# Patient Record
Sex: FEMALE | Race: AMERICAN INDIAN OR ALASKA NATIVE
[De-identification: names, ages, dates, MRNs, and addresses within clinical notes are randomized per-mention and may not be internally consistent; named-entity substitution may affect disease eponyms.]

---

## 2017-10-26 ENCOUNTER — HOSPITAL ENCOUNTER (EMERGENCY)
Dept: HOSPITAL 5 - ED | Age: 62
Discharge: HOME | End: 2017-10-26
Payer: SELF-PAY

## 2017-10-26 VITALS — SYSTOLIC BLOOD PRESSURE: 131 MMHG | DIASTOLIC BLOOD PRESSURE: 81 MMHG

## 2017-10-26 DIAGNOSIS — I10: ICD-10-CM

## 2017-10-26 DIAGNOSIS — R56.9: Primary | ICD-10-CM

## 2017-10-26 LAB
ANION GAP SERPL CALC-SCNC: 13 MMOL/L
APTT BLD: 28.2 SEC. (ref 24.2–36.6)
BILIRUB UR QL STRIP: (no result)
BLOOD UR QL VISUAL: (no result)
BUN SERPL-MCNC: 7 MG/DL (ref 7–17)
BUN/CREAT SERPL: 10 %
CALCIUM SERPL-MCNC: 10.5 MG/DL (ref 8.4–10.2)
CHLORIDE SERPL-SCNC: 102.6 MMOL/L (ref 98–107)
CO2 SERPL-SCNC: 27 MMOL/L (ref 22–30)
GLUCOSE SERPL-MCNC: 92 MG/DL (ref 65–100)
HCT VFR BLD CALC: 41.5 % (ref 30.3–42.9)
HGB BLD-MCNC: 13.8 GM/DL (ref 10.1–14.3)
INR PPP: 0.94 (ref 0.87–1.13)
KETONES UR STRIP-MCNC: (no result) MG/DL
LEUKOCYTE ESTERASE UR QL STRIP: (no result)
MCH RBC QN AUTO: 27 PG (ref 28–32)
MCHC RBC AUTO-ENTMCNC: 33 % (ref 30–34)
MCV RBC AUTO: 82 FL (ref 79–97)
MUCOUS THREADS #/AREA URNS HPF: (no result) /HPF
NITRITE UR QL STRIP: (no result)
PH UR STRIP: 7 [PH] (ref 5–7)
PLATELET # BLD: 243 K/MM3 (ref 140–440)
POTASSIUM SERPL-SCNC: 4.4 MMOL/L (ref 3.6–5)
PROT UR STRIP-MCNC: (no result) MG/DL
RBC # BLD AUTO: 5.06 M/MM3 (ref 3.65–5.03)
RBC #/AREA URNS HPF: < 1 /HPF (ref 0–6)
SODIUM SERPL-SCNC: 138 MMOL/L (ref 137–145)
UROBILINOGEN UR-MCNC: < 2 MG/DL (ref ?–2)
WBC # BLD AUTO: 5.2 K/MM3 (ref 4.5–11)
WBC #/AREA URNS HPF: < 1 /HPF (ref 0–6)

## 2017-10-26 PROCEDURE — 93005 ELECTROCARDIOGRAM TRACING: CPT

## 2017-10-26 PROCEDURE — 71010: CPT

## 2017-10-26 PROCEDURE — 82550 ASSAY OF CK (CPK): CPT

## 2017-10-26 PROCEDURE — 82962 GLUCOSE BLOOD TEST: CPT

## 2017-10-26 PROCEDURE — 84702 CHORIONIC GONADOTROPIN TEST: CPT

## 2017-10-26 PROCEDURE — 85610 PROTHROMBIN TIME: CPT

## 2017-10-26 PROCEDURE — 93010 ELECTROCARDIOGRAM REPORT: CPT

## 2017-10-26 PROCEDURE — 36415 COLL VENOUS BLD VENIPUNCTURE: CPT

## 2017-10-26 PROCEDURE — 85027 COMPLETE CBC AUTOMATED: CPT

## 2017-10-26 PROCEDURE — 85730 THROMBOPLASTIN TIME PARTIAL: CPT

## 2017-10-26 PROCEDURE — 80048 BASIC METABOLIC PNL TOTAL CA: CPT

## 2017-10-26 PROCEDURE — 81001 URINALYSIS AUTO W/SCOPE: CPT

## 2017-10-26 PROCEDURE — 70450 CT HEAD/BRAIN W/O DYE: CPT

## 2017-10-26 PROCEDURE — 99285 EMERGENCY DEPT VISIT HI MDM: CPT

## 2017-10-26 NOTE — EMERGENCY DEPARTMENT REPORT
ED General Adult HPI





- General


Chief complaint: Seizure


Stated complaint: SEIZURE


Time Seen by Provider: 10/26/17 12:09


Source: patient, EMS (ems notes not available at time of  chart dictation), RN 

notes reviewed


Mode of arrival: Stretcher


Limitations: Altered Mental Status, Physical Limitation





- History of Present Illness


Initial comments: 





Past medical history: Ischemic stroke, left-sided, right-sided deficits, 

patient has home health aid, and is dependent on others for activities of daily 

living.





Also has a history of multiple sclerosis and hypertension; private neurologist 

is Dr. Sj Cuenca





Patient is brought to the hospital by EMS for seizure-like activity.  As per 

verbal report from nurse, there is no trauma, family corroborates this, patient 

given Ativan in the field by EMS, he should now sleepy, and is not able to 

describe exacerbating or relieving factors.





As per her family, patient has been neurologically devastated from her prior 

stroke, and is heavily dependent on home health aide for help with activities 

of daily living.  At her baseline, she requires assistance with getting dressed

, getting closed, going to the bathroom.  No fevers or chills.





-: Gradual


Improves with: medication





- Related Data


 Home Medications











 Medication  Instructions  Recorded  Confirmed  Last Taken


 


Losartan Potassium 50 mg PO DAILY 10/26/17 10/26/17 Unknown


 


Metoprolol 25 mg PO BID 10/26/17 10/26/17 Unknown


 


Sertraline 50 mg PO DAILY 10/26/17 10/26/17 Unknown


 


Vitamin D3 500 mg PO DAILY 10/26/17 10/26/17 Unknown


 


amLODIPine 5 mg PO DAILY 10/26/17 10/26/17 Unknown








 Previous Rx's











 Medication  Instructions  Recorded  Last Taken  Type


 


levETIRAcetam [Keppra ORAL LIQ] 500 mg PO BID #1 bottle 10/26/17 Unknown Rx











 Allergies











Allergy/AdvReac Type Severity Reaction Status Date / Time


 


No Known Allergies Allergy   Verified 10/26/17 11:50














ED Review of Systems


ROS: 


Stated complaint: SEIZURE


Other details as noted in HPI





Constitutional: malaise


Eyes: denies: vision change


ENT: denies: epistaxis


Respiratory: denies: cough


Cardiovascular: denies: chest pain


Gastrointestinal: denies: vomiting


Genitourinary: as per HPI


Musculoskeletal: as per HPI


Skin: as per HPI


Neurological: as per HPI, weakness


Psychiatric: as per HPI





ED Past Medical Hx





- Past Medical History


Hx Hypertension: Yes


Additional medical history: MS stroke





- Surgical History


Past Surgical History?: No





- Social History


Smoking Status: Never Smoker


Substance Use Type: None





- Medications


Home Medications: 


 Home Medications











 Medication  Instructions  Recorded  Confirmed  Last Taken  Type


 


Losartan Potassium 50 mg PO DAILY 10/26/17 10/26/17 Unknown History


 


Metoprolol 25 mg PO BID 10/26/17 10/26/17 Unknown History


 


Sertraline 50 mg PO DAILY 10/26/17 10/26/17 Unknown History


 


Vitamin D3 500 mg PO DAILY 10/26/17 10/26/17 Unknown History


 


amLODIPine 5 mg PO DAILY 10/26/17 10/26/17 Unknown History


 


levETIRAcetam [Keppra ORAL LIQ] 500 mg PO BID #1 bottle 10/26/17  Unknown Rx














ED Physical Exam





- General


Limitations: Altered Mental Status, Physical Limitation


General appearance: in no apparent distress





- Head


Head exam: Present: atraumatic, normocephalic





- Eye


Eye exam: Present: normal appearance, PERRL, EOMI





- ENT


ENT exam: Present: normal exam, normal orophraynx, mucous membranes moist, TM's 

normal bilaterally, normal external ear exam





- Neck


Neck exam: Present: normal inspection, full ROM.  Absent: tenderness, 

meningismus





- Respiratory


Respiratory exam: Present: normal lung sounds bilaterally.  Absent: respiratory 

distress, wheezes, rales, rhonchi, stridor, chest wall tenderness, accessory 

muscle use, decreased breath sounds, prolonged expiratory





- Cardiovascular


Cardiovascular Exam: Present: regular rate, normal rhythm, normal heart sounds.

  Absent: bradycardia, tachycardia, irregular rhythm, systolic murmur, 

diastolic murmur, rubs, gallop





- GI/Abdominal


GI/Abdominal exam: Present: soft, normal bowel sounds.  Absent: distended, 

tenderness, guarding, rebound, rigid, pulsatile mass





- Extremities Exam


Extremities exam: Present: normal inspection, normal capillary refill.  Absent: 

tenderness, calf tenderness





- Back Exam


Back exam: Present: normal inspection.  Absent: tenderness, paraspinal 

tenderness





- Neurological Exam


Neurological exam: Present: alert, motor sensory deficit, other (patient is 

minimally verbal.  She occasionally says "yes."  Patient has 4 out of 5 

strength bilateral upper and lower extremities.  Patient is unable to indicate 

whether not she has any sensation intact.)





- Psychiatric


Psychiatric exam: Present: normal affect, normal mood





- Skin


Skin exam: Present: warm, dry, intact, normal color.  Absent: rash





ED Course


 Vital Signs











  10/26/17 10/26/17 10/26/17





  11:50 12:47 13:19


 


Temperature 97.8 F  98.7 F


 


Pulse Rate 67 59 L 


 


Respiratory  16 





Rate   


 


Blood Pressure 139/47  


 


Blood Pressure  125/80 





[Right]   


 


O2 Sat by Pulse 97 100 





Oximetry   














  10/26/17 10/26/17 10/26/17





  14:27 15:00 15:30


 


Temperature   


 


Pulse Rate 57 L 52 L 56 L


 


Respiratory 14 15 14





Rate   


 


Blood Pressure   


 


Blood Pressure 132/77 133/72 127/72





[Right]   


 


O2 Sat by Pulse 99 99 98





Oximetry   














  10/26/17 10/26/17





  16:00 16:30


 


Temperature  


 


Pulse Rate 57 L 59 L


 


Respiratory 13 





Rate  


 


Blood Pressure  


 


Blood Pressure 123/73 131/81





[Right]  


 


O2 Sat by Pulse 99 





Oximetry  














ED Medical Decision Making





- Lab Data


Result diagrams: 


 10/26/17 13:59





 10/26/17 13:59








 Vital Signs











  10/26/17 10/26/17 10/26/17





  11:50 12:47 13:19


 


Temperature 97.8 F  98.7 F


 


Pulse Rate 67 59 L 


 


Respiratory  16 





Rate   


 


Blood Pressure 139/47  


 


Blood Pressure  125/80 





[Right]   


 


O2 Sat by Pulse 97 100 





Oximetry   














 Lab Results











  10/26/17 10/26/17 10/26/17 Range/Units





  13:20 13:59 13:59 


 


WBC     (4.5-11.0)  K/mm3


 


RBC     (3.65-5.03)  M/mm3


 


Hgb     (10.1-14.3)  gm/dl


 


Hct     (30.3-42.9)  %


 


MCV     (79-97)  fl


 


MCH     (28-32)  pg


 


MCHC     (30-34)  %


 


RDW     (13.2-15.2)  %


 


Plt Count     (140-440)  K/mm3


 


PT   13.1   (12.2-14.9)  Sec.


 


INR   0.94   (0.87-1.13)  


 


APTT   28.2   (24.2-36.6)  Sec.


 


Sodium    138  (137-145)  mmol/L


 


Potassium    4.4  (3.6-5.0)  mmol/L


 


Chloride    102.6  ()  mmol/L


 


Carbon Dioxide    27  (22-30)  mmol/L


 


Anion Gap    13  mmol/L


 


BUN    7  (7-17)  mg/dL


 


Creatinine    0.7  (0.7-1.2)  mg/dL


 


Estimated GFR    > 60  ml/min


 


BUN/Creatinine Ratio    10  %


 


Glucose    92  ()  mg/dL


 


POC Glucose     ()  


 


Calcium    10.5 H  (8.4-10.2)  mg/dL


 


Total Creatine Kinase     ()  units/L


 


HCG, Quant     (0-4)  mIU/mL


 


Urine Color  Straw    (Yellow)  


 


Urine Turbidity  Clear    (Clear)  


 


Urine pH  7.0    (5.0-7.0)  


 


Ur Specific Gravity  1.004    (1.003-1.030)  


 


Urine Protein  <15 mg/dl    (Negative)  mg/dL


 


Urine Glucose (UA)  Neg    (Negative)  mg/dL


 


Urine Ketones  Neg    (Negative)  mg/dL


 


Urine Blood  Neg    (Negative)  


 


Urine Nitrite  Neg    (Negative)  


 


Urine Bilirubin  Neg    (Negative)  


 


Urine Urobilinogen  < 2.0    (<2.0)  mg/dL


 


Ur Leukocyte Esterase  Neg    (Negative)  


 


Urine WBC (Auto)  < 1.0    (0.0-6.0)  /HPF


 


Urine RBC (Auto)  < 1.0    (0.0-6.0)  /HPF


 


U Epithel Cells (Auto)  1.0    (0-13.0)  /HPF


 


Urine Mucus  Few    /HPF














  10/26/17 10/26/17 10/26/17 Range/Units





  13:59 13:59 13:59 


 


WBC  5.2    (4.5-11.0)  K/mm3


 


RBC  5.06 H    (3.65-5.03)  M/mm3


 


Hgb  13.8    (10.1-14.3)  gm/dl


 


Hct  41.5    (30.3-42.9)  %


 


MCV  82    (79-97)  fl


 


MCH  27 L    (28-32)  pg


 


MCHC  33    (30-34)  %


 


RDW  14.8    (13.2-15.2)  %


 


Plt Count  243    (140-440)  K/mm3


 


PT     (12.2-14.9)  Sec.


 


INR     (0.87-1.13)  


 


APTT     (24.2-36.6)  Sec.


 


Sodium     (137-145)  mmol/L


 


Potassium     (3.6-5.0)  mmol/L


 


Chloride     ()  mmol/L


 


Carbon Dioxide     (22-30)  mmol/L


 


Anion Gap     mmol/L


 


BUN     (7-17)  mg/dL


 


Creatinine     (0.7-1.2)  mg/dL


 


Estimated GFR     ml/min


 


BUN/Creatinine Ratio     %


 


Glucose     ()  mg/dL


 


POC Glucose     ()  


 


Calcium     (8.4-10.2)  mg/dL


 


Total Creatine Kinase   48   ()  units/L


 


HCG, Quant    6.25 H  (0-4)  mIU/mL


 


Urine Color     (Yellow)  


 


Urine Turbidity     (Clear)  


 


Urine pH     (5.0-7.0)  


 


Ur Specific Gravity     (1.003-1.030)  


 


Urine Protein     (Negative)  mg/dL


 


Urine Glucose (UA)     (Negative)  mg/dL


 


Urine Ketones     (Negative)  mg/dL


 


Urine Blood     (Negative)  


 


Urine Nitrite     (Negative)  


 


Urine Bilirubin     (Negative)  


 


Urine Urobilinogen     (<2.0)  mg/dL


 


Ur Leukocyte Esterase     (Negative)  


 


Urine WBC (Auto)     (0.0-6.0)  /HPF


 


Urine RBC (Auto)     (0.0-6.0)  /HPF


 


U Epithel Cells (Auto)     (0-13.0)  /HPF


 


Urine Mucus     /HPF














  10/26/17 Range/Units





  14:30 


 


WBC   (4.5-11.0)  K/mm3


 


RBC   (3.65-5.03)  M/mm3


 


Hgb   (10.1-14.3)  gm/dl


 


Hct   (30.3-42.9)  %


 


MCV   (79-97)  fl


 


MCH   (28-32)  pg


 


MCHC   (30-34)  %


 


RDW   (13.2-15.2)  %


 


Plt Count   (140-440)  K/mm3


 


PT   (12.2-14.9)  Sec.


 


INR   (0.87-1.13)  


 


APTT   (24.2-36.6)  Sec.


 


Sodium   (137-145)  mmol/L


 


Potassium   (3.6-5.0)  mmol/L


 


Chloride   ()  mmol/L


 


Carbon Dioxide   (22-30)  mmol/L


 


Anion Gap   mmol/L


 


BUN   (7-17)  mg/dL


 


Creatinine   (0.7-1.2)  mg/dL


 


Estimated GFR   ml/min


 


BUN/Creatinine Ratio   %


 


Glucose   ()  mg/dL


 


POC Glucose  112 H  ()  


 


Calcium   (8.4-10.2)  mg/dL


 


Total Creatine Kinase   ()  units/L


 


HCG, Quant   (0-4)  mIU/mL


 


Urine Color   (Yellow)  


 


Urine Turbidity   (Clear)  


 


Urine pH   (5.0-7.0)  


 


Ur Specific Gravity   (1.003-1.030)  


 


Urine Protein   (Negative)  mg/dL


 


Urine Glucose (UA)   (Negative)  mg/dL


 


Urine Ketones   (Negative)  mg/dL


 


Urine Blood   (Negative)  


 


Urine Nitrite   (Negative)  


 


Urine Bilirubin   (Negative)  


 


Urine Urobilinogen   (<2.0)  mg/dL


 


Ur Leukocyte Esterase   (Negative)  


 


Urine WBC (Auto)   (0.0-6.0)  /HPF


 


Urine RBC (Auto)   (0.0-6.0)  /HPF


 


U Epithel Cells (Auto)   (0-13.0)  /HPF


 


Urine Mucus   /HPF














- EKG Data


-: EKG Interpreted by Me


EKG shows normal: sinus rhythm





- EKG Data


When compared to previous EKG there are: previous EKG unavailable





10/26/17 16:21


Normal sinus, 57 beats per minute, borderline left axis deviation, low voltage 

in the anteroseptal leads, patient not endorsing chest pain, not 

morphologically consistent with STEMI, QTC within normal limits.





- Radiology Data


Radiology results: report reviewed, image reviewed





Noncontrast CT scan demonstrates chronic white matter ischemic changes.  No 

acute findings.





X-ray the chest is negative.











- Medical Decision Making





Differential diagnosis: Pneumonia, intracranial hemorrhage, urinary tract 

infection, electrolyte derangement, convulsive activity secondary to underlying 

multiple sclerosis and large old ischemic stroke





Assessment and plan: 62-year-old female, poor mobility at baseline, dependent 

on others for help with activities of daily living, underlying history of 

ischemic stroke, sclerosis, with nonspecific convulsive event today.  This was 

terminated with Ativan from EMS in the field.  The patient is afebrile with 

reassuring vital signs, is sleepy but arousable, follows commands, only at the 

bedside, they think that she is slightly sleepy when compared to baseline, 

however the indicate that her motor exam appears to be unchanged from baseline.





Contacted her private neurologist, Dr. Sj Cuenca and relayed the patient's 

physical exam findings, history and laboratory findings.  He does not recommend 

admission for MRI, and advises that underlying multiple sclerosis and large 

history of ischemic stroke risk factors for convulsive activity.  He does 

recommend Keppra, 500 mg twice daily, and indicates he will have his office 

contact the patient's family to arrange close outpatient follow-up.  Patient 

observed in the ER for a prolonged time without clinical decompensation, she 

will be discharged at this time, family understands that she cannot drive, and 

neurology will follow her up.


Critical care attestation.: 


If time is entered above; I have spent that time in minutes in the direct care 

of this critically ill patient, excluding procedure time.








ED Disposition


Clinical Impression: 


 History of convulsions





Disposition: DC-01 TO HOME OR SELFCARE


Is pt being admited?: No


Does the pt Need Aspirin: No


Condition: Stable


Instructions:  New-Onset Seizure in Adults (ED)


Additional Instructions: 


Take the Keppra seizure medication as directed.  Follow up with your neurology 

specialist within the next 3-5 days.





[facility] [address], [address] [place] [id number]


For General Information:


[phone]








Patient should not drive a car or operate motor vehicles until cleared by her 

primary care doctor or neurologist.  Return to the ER Runaway with new pain, 

worsened pain, migration of pain, fevers, chills, lethargy, irritability, 

projectile vomiting, change in mental status, inability to tolerate liquid feeds

, confusion, lethargy.


Prescriptions: 


levETIRAcetam [Keppra ORAL LIQ] 500 mg PO BID #1 bottle


Referrals: 


PRIMARY CARE,MD [Primary Care Provider] - 3-5 Days


CECILIO JIMENEZ MD [Referring] - 3-5 Days


THEODORE ARAUJO MD [Staff Physician] - 3-5 Days


RAMON JACOBSON MD [Staff Physician] - 3-5 Days

## 2017-10-26 NOTE — XRAY REPORT
Single view chest:



History: Seizure, PNA.



Findings:



Normal cardiomediastinal silhouette. Trachea is midline. No 

consolidation, pneumothorax or pleural effusion.



Impression:



No acute cardiopulmonary findings.

## 2017-10-26 NOTE — CAT SCAN REPORT
Cranial CT without contrast.



History: Seizure.



Findings: There no prior studies available for comparison. There is a 

large area of encephalomalacia involving the left MCA territory 

primarily involving the left temporal and parietal lobes. There is a 

small area of encephalomalacia in the right MCA territory. There is no 

evidence of acute infarct or hemorrhage. There is a chronic infarct in 

the left cerebellar hemisphere. A  chronic lacunar infarct is seen in 

the right cerebellar hemisphere. There are no masses or extra-axial 

collections.



The calvarium is intact.



Impression: No acute findings. Numerous areas of chronic 

infarct/encephalomalacia, the largest of which is in the MCA territory 

are described.

## 2020-06-18 ENCOUNTER — OUT OF OFFICE VISIT (OUTPATIENT)
Dept: URBAN - METROPOLITAN AREA MEDICAL CENTER 41 | Facility: MEDICAL CENTER | Age: 65
End: 2020-06-18
Payer: MEDICARE

## 2020-06-18 DIAGNOSIS — I10 ACCELERATED ESSENTIAL HYPERTENSION: ICD-10-CM

## 2020-06-18 DIAGNOSIS — R63.8 CHANGE IN EATING HABITS: ICD-10-CM

## 2020-06-18 DIAGNOSIS — R41.82 ALTERED MENTAL STATE: ICD-10-CM

## 2020-06-18 DIAGNOSIS — R13.12 DYSPHAGIA, OROPHARYNGEAL: ICD-10-CM

## 2020-06-18 PROCEDURE — G8427 DOCREV CUR MEDS BY ELIG CLIN: HCPCS | Performed by: INTERNAL MEDICINE

## 2020-06-18 PROCEDURE — 43246 EGD PLACE GASTROSTOMY TUBE: CPT | Performed by: INTERNAL MEDICINE

## 2020-06-18 PROCEDURE — 99222 1ST HOSP IP/OBS MODERATE 55: CPT | Performed by: INTERNAL MEDICINE

## 2020-06-20 ENCOUNTER — OUT OF OFFICE VISIT (OUTPATIENT)
Dept: URBAN - METROPOLITAN AREA MEDICAL CENTER 41 | Facility: MEDICAL CENTER | Age: 65
End: 2020-06-20
Payer: MEDICARE

## 2020-06-20 DIAGNOSIS — R13.19 CERVICAL DYSPHAGIA: ICD-10-CM

## 2020-06-20 PROCEDURE — 99231 SBSQ HOSP IP/OBS SF/LOW 25: CPT | Performed by: INTERNAL MEDICINE
